# Patient Record
Sex: MALE | Race: WHITE | NOT HISPANIC OR LATINO | URBAN - NONMETROPOLITAN AREA
[De-identification: names, ages, dates, MRNs, and addresses within clinical notes are randomized per-mention and may not be internally consistent; named-entity substitution may affect disease eponyms.]

---

## 2019-01-08 ENCOUNTER — IMPORTED ENCOUNTER (OUTPATIENT)
Dept: URBAN - NONMETROPOLITAN AREA CLINIC 1 | Facility: CLINIC | Age: 64
End: 2019-01-08

## 2019-01-08 PROBLEM — H40.1131: Noted: 2019-01-08

## 2019-01-08 PROBLEM — Z96.1: Noted: 2019-01-08

## 2019-01-08 PROCEDURE — 99204 OFFICE O/P NEW MOD 45 MIN: CPT

## 2019-01-08 NOTE — PATIENT DISCUSSION
s/p PCIOL-Stable PCIOL OSCOAG-Appears stable. -Continue Combigan OU BID  as directed.  Stressed importance of compliance.-Order HVF 24-2-RTC 3mos Ta check and HVF 24-2; '[facility]

## 2019-07-02 ENCOUNTER — IMPORTED ENCOUNTER (OUTPATIENT)
Dept: URBAN - METROPOLITAN AREA CLINIC 1 | Facility: CLINIC | Age: 64
End: 2019-07-02

## 2019-07-02 PROBLEM — H40.52X1: Noted: 2019-07-02

## 2019-07-02 PROBLEM — H25.811: Noted: 2019-07-02

## 2019-07-02 PROBLEM — Z96.1: Noted: 2019-07-02

## 2019-07-02 PROCEDURE — 92133 CPTRZD OPH DX IMG PST SGM ON: CPT

## 2019-07-02 PROCEDURE — 99203 OFFICE O/P NEW LOW 30 MIN: CPT

## 2019-07-02 PROCEDURE — 92015 DETERMINE REFRACTIVE STATE: CPT

## 2019-07-02 NOTE — PATIENT DISCUSSION
1.  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 H/o Bleb OS. IOP in good control on Combigan BID OS. OCT shows moderate thinning OS. Cont Combigan BID OS. 2.  Cataract OD: Observe for now without intervention. The patient was advised to contact us if any change or worsening of vision3. Pseudophakia OS- [facility]) Phaco secondary to previous trauma OS. .  MRx for glasses given Return for an appointment in 6 mo 10 VF 24-2 OU with Dr. Guillermina Angel.

## 2020-10-09 ENCOUNTER — IMPORTED ENCOUNTER (OUTPATIENT)
Dept: URBAN - NONMETROPOLITAN AREA CLINIC 1 | Facility: CLINIC | Age: 65
End: 2020-10-09

## 2020-10-09 PROCEDURE — 92015 DETERMINE REFRACTIVE STATE: CPT

## 2020-10-09 PROCEDURE — 92133 CPTRZD OPH DX IMG PST SGM ON: CPT

## 2020-10-09 PROCEDURE — 92014 COMPRE OPH EXAM EST PT 1/>: CPT

## 2020-10-09 NOTE — PATIENT DISCUSSION
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

## 2020-10-10 PROBLEM — H52.03: Noted: 2020-10-10

## 2020-10-10 PROBLEM — H40.32X1: Noted: 2020-10-10

## 2020-10-10 PROBLEM — Z96.1: Noted: 2020-10-10

## 2020-10-10 PROBLEM — H52.4: Noted: 2020-10-10

## 2020-11-03 ENCOUNTER — IMPORTED ENCOUNTER (OUTPATIENT)
Dept: URBAN - NONMETROPOLITAN AREA CLINIC 1 | Facility: CLINIC | Age: 65
End: 2020-11-03

## 2020-11-03 PROCEDURE — 99213 OFFICE O/P EST LOW 20 MIN: CPT

## 2020-11-03 NOTE — PATIENT DISCUSSION
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

## 2021-02-18 ENCOUNTER — IMPORTED ENCOUNTER (OUTPATIENT)
Dept: URBAN - NONMETROPOLITAN AREA CLINIC 1 | Facility: CLINIC | Age: 66
End: 2021-02-18

## 2021-02-18 PROCEDURE — 92083 EXTENDED VISUAL FIELD XM: CPT

## 2021-02-18 NOTE — PATIENT DISCUSSION
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

## 2021-05-17 ENCOUNTER — IMPORTED ENCOUNTER (OUTPATIENT)
Dept: URBAN - NONMETROPOLITAN AREA CLINIC 1 | Facility: CLINIC | Age: 66
End: 2021-05-17

## 2021-05-17 ENCOUNTER — PREPPED CHART (OUTPATIENT)
Dept: URBAN - NONMETROPOLITAN AREA CLINIC 4 | Facility: CLINIC | Age: 66
End: 2021-05-17

## 2021-05-17 PROCEDURE — 92133 CPTRZD OPH DX IMG PST SGM ON: CPT

## 2021-05-17 PROCEDURE — 92020 GONIOSCOPY: CPT

## 2021-05-17 PROCEDURE — 99213 OFFICE O/P EST LOW 20 MIN: CPT

## 2021-05-17 NOTE — PATIENT DISCUSSION
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-  discussed findings w/patient-  IOPs 14 OU-  24-2 VF done 2/18/2021    OD: UR central scotoma baseline    OS: UR central scotoma inferior arcuate scotoma baseline-  OCT ONH 5/17/2021   OD: 8/10 SS 93 um Normal RFNL all quadrants   OS: 6/10 SS  76 um severe temporal thinning normal RFNL all other quadrants- Gonio done 5/17/2021   Grade III moderate-  continue Latanoprost QHS OS -  start Lumify PRN-  RTC 3 mo f/u w/OCT ON and GonioPseudophakia OS-  discussed findings w/patient-  no changes noted at this time-  continue to monitor as scheduled or prn; 's Notes: Patient is [address] 10/9/2020DFE 10/9/111475-0 VF 2/18/2021OCT Duarte Zamora 74 5/17/2021Gonio 5/17/2021

## 2021-05-17 NOTE — PATIENT DISCUSSION
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-  discussed findings w/patient-  IOPs 14 OU-  24-2 VF done 2/18/2021    OD: UR central scotoma baseline    OS: UR central scotoma inferior arcuate scotoma baseline-  OCT ONH 5/17/2021   OD: 8/10 SS 93 um Normal RFNL all quadrants   OS: 6/10 SS  76 um severe temporal thinning normal RFNL all other quadrants- Gonio done 5/17/2021   Grade III moderate-  continue Latanoprost QHS OS -  start Lumify PRN-  RTC 3 mo f/u w/OCT ON and GonioPseudophakia OS-  discussed findings w/patient-  no changes noted at this time-  continue to monitor as scheduled or prn; 's Notes: Patient is [address] 10/9/2020DFE 10/9/883081-7 VF 2/18/2021OCT [address] 74 5/17/2021Gonio 5/17/2021

## 2022-01-17 NOTE — PATIENT DISCUSSION
Discussed excision on follow-up if no improvement/resolution. Will biopsy lid margin at same time if no improvement/resolution.

## 2022-01-31 ENCOUNTER — COMPREHENSIVE EXAM (OUTPATIENT)
Dept: URBAN - NONMETROPOLITAN AREA CLINIC 4 | Facility: CLINIC | Age: 67
End: 2022-01-31

## 2022-01-31 DIAGNOSIS — H40.32X1: ICD-10-CM

## 2022-01-31 PROCEDURE — 99213 OFFICE O/P EST LOW 20 MIN: CPT

## 2022-01-31 ASSESSMENT — VISUAL ACUITY
OS_SC: 20/25
OU_SC: 20/20
OU_SC: 20/20
OD_SC: 20/20

## 2022-01-31 ASSESSMENT — TONOMETRY
OS_IOP_MMHG: 14
OD_IOP_MMHG: 15

## 2022-04-08 ASSESSMENT — TONOMETRY
OS_IOP_MMHG: 8
OD_IOP_MMHG: 16

## 2022-04-08 ASSESSMENT — VISUAL ACUITY
OD_CC: 20/30-2
OS_CC: 20/30

## 2022-04-10 ASSESSMENT — TONOMETRY
OS_IOP_MMHG: 14
OS_IOP_MMHG: 14
OD_IOP_MMHG: 13
OD_IOP_MMHG: 14
OD_IOP_MMHG: 13
OD_IOP_MMHG: 11
OS_IOP_MMHG: 13
OS_IOP_MMHG: 13
OS_IOP_MMHG: 11
OD_IOP_MMHG: 14

## 2022-04-10 ASSESSMENT — VISUAL ACUITY
OU_SC: 20/30
OS_CC: 20/20-1
OU_CC: 20/20
OD_CC: 20/30+
OU_CC: 20/20
OS_CC: 20/25
OD_CC: 20/25+2
OD_CC: 20/20
OD_CC: 20/20
OS_CC: 20/25
OD_CC: 20/20
OS_CC: 20/50-2
OS_CC: 20/25+

## 2022-05-04 ENCOUNTER — FOLLOW UP (OUTPATIENT)
Dept: URBAN - NONMETROPOLITAN AREA CLINIC 4 | Facility: CLINIC | Age: 67
End: 2022-05-04

## 2022-05-04 DIAGNOSIS — H40.32X1: ICD-10-CM

## 2022-05-04 PROCEDURE — 99213 OFFICE O/P EST LOW 20 MIN: CPT

## 2022-05-04 PROCEDURE — 92083 EXTENDED VISUAL FIELD XM: CPT

## 2022-05-04 ASSESSMENT — TONOMETRY
OD_IOP_MMHG: 15
OS_IOP_MMHG: 16

## 2022-05-04 ASSESSMENT — VISUAL ACUITY
OS_SC: 20/25+2
OD_SC: 20/25

## 2022-09-14 ENCOUNTER — FOLLOW UP (OUTPATIENT)
Dept: URBAN - NONMETROPOLITAN AREA CLINIC 4 | Facility: CLINIC | Age: 67
End: 2022-09-14

## 2022-09-14 DIAGNOSIS — H26.492: ICD-10-CM

## 2022-09-14 DIAGNOSIS — H25.811: ICD-10-CM

## 2022-09-14 DIAGNOSIS — H40.32X1: ICD-10-CM

## 2022-09-14 PROCEDURE — 92014 COMPRE OPH EXAM EST PT 1/>: CPT

## 2022-09-14 ASSESSMENT — VISUAL ACUITY
OD_SC: 20/20-1
OU_SC: 20/30
OU_SC: 20/20-1
OS_SC: 20/20

## 2022-09-14 ASSESSMENT — TONOMETRY
OS_IOP_MMHG: 14
OD_IOP_MMHG: 14

## 2022-09-14 NOTE — PATIENT DISCUSSION
(Observe) Observe and consider YAG cap when pt feels pco visually significant and visual acuity decreases to appropriate level. Number Of Curettages: 3 Additional Information: (Optional): The wound was cleaned, and a pressure dressing was applied.  The patient received detailed post-op instructions. Concentration (Mg/Ml Or Millions Of Plaque Forming Units/Cc): 0.01 Post-Care Instructions: I reviewed with the patient in detail post-care instructions. Patient is to keep the area dry for 48 hours, and not to engage in any swimming until the area is healed. Should the patient develop any fevers, chills, bleeding, severe pain patient will contact the office immediately. Detail Level: Detailed Add Intralesional Injection: No Consent was obtained from the patient. The risks, benefits and alternatives to therapy were discussed in detail. Specifically, the risks of infection, scarring, bleeding, prolonged wound healing, nerve injury, incomplete removal, allergy to anesthesia and recurrence were addressed. Alternatives to ED&C, such as: surgical removal and XRT were also discussed.  Prior to the procedure, the treatment site was clearly identified and confirmed by the patient. All components of Universal Protocol/PAUSE Rule completed. Cautery Type: electrodesiccation Total Volume (Ccs): 1 Body Location Override (Optional - Billing Will Still Be Based On Selected Body Map Location If Applicable): right upper back Anesthesia Type: 1% lidocaine with epinephrine What Was Performed First?: Curettage Size Of Lesion In Cm: 1.2 Size Of Lesion After Curettage: 1.4 Bill As A Line Item Or As Units: Line Item

## 2022-10-26 NOTE — PATIENT DISCUSSION
RD/tear warning symptoms reviewed. F&F brochure given. Call immediately if increase in floaters, flashes, veil, blur. Breath sounds clear and equal bilaterally.

## 2022-10-26 NOTE — PATIENT DISCUSSION
This visual field clearly demonstrated a minimum of 43% loss of upper field of vision OU, with upper lid skin in repose and elevated by taping of the lid to demonstrate potential correction. This field shows that taping the lids significantly improved this patient's superior field of vision by approximately 34%, OU.

## 2023-01-04 NOTE — PROCEDURE NOTE: SURGICAL
"<div><p><strong>MR #: [id number]</strong></p><p><strong>&nbsp;</strong></p><p><strong>PREOPERATIVE DIAGNOSIS</strong>: <span>&nbsp;&nbsp; </span><span>&nbsp;&nbsp;&nbsp;&nbsp;&nbsp;&nbsp;&nbsp;&nbsp;&nbsp;&nbsp;&nbsp;</span>Dermatochalasis upper lids</p><p><span>&nbsp;</span></p><p><strong>POSTOPERATIVE DIAGNOSIS</strong>: <span>&nbsp;&nbsp;&nbsp;&nbsp;&nbsp;&nbsp;&nbsp;&nbsp;&nbsp;&nbsp;&nbsp; </span>Same </p><p>&nbsp;</p><p style=""margin-right:-13.5pt;tab-stops:-1.0in;""><strong>PROCEDURE:</strong> <span>&nbsp; &nbsp; &nbsp; &nbsp; &nbsp; &nbsp;</span>Upper Lid Blepharoplasty with Fat Removal OU<strong><span>&nbsp;&nbsp;&nbsp;&nbsp;&nbsp;&nbsp;&nbsp;&nbsp;&nbsp;&nbsp;&nbsp;&nbsp;&nbsp;&nbsp;&nbsp;&nbsp;&nbsp;&nbsp;&nbsp;&nbsp;&nbsp;&nbsp;&nbsp;&nbsp;&nbsp;&nbsp;&nbsp;&nbsp;&nbsp; </span><span>&nbsp;&nbsp;&nbsp;&nbsp;&nbsp;&nbsp;&nbsp;&nbsp;&nbsp;&nbsp;&nbsp;&nbsp;&nbsp;&nbsp;&nbsp;&nbsp;&nbsp;&nbsp;&nbsp;&nbsp;&nbsp;&nbsp;&nbsp; </span></strong></p><p><strong>SURGEON:</strong> <span>&nbsp; &nbsp; &nbsp; &nbsp; &nbsp; &nbsp; &nbsp; &nbsp;</span>Jong Almazan

## 2023-01-11 NOTE — PATIENT DISCUSSION
One week post op: great curve and shape, no infection, healing well, sutures intact and removed 1/11/23, use erythromycin BID to upper eyelids x 10 days. Use Skinuva BID x 1 month to incisions after 10 days. Wear sunglasses and hat while outdoors. Avoid sun exposure. No restrictions in 5 days.

## 2023-01-11 NOTE — PATIENT DISCUSSION
Recommend Bilateral lower lid blepharoplasty trans conj laser (discussed risks and benefits of sx. .. ). negative detailed exam

## 2023-01-12 ENCOUNTER — CONSULTATION/EVALUATION (OUTPATIENT)
Dept: URBAN - NONMETROPOLITAN AREA CLINIC 4 | Facility: CLINIC | Age: 68
End: 2023-01-12

## 2023-01-12 DIAGNOSIS — H25.811: ICD-10-CM

## 2023-01-12 PROCEDURE — 99214 OFFICE O/P EST MOD 30 MIN: CPT

## 2023-01-12 ASSESSMENT — VISUAL ACUITY
OD_BAT: 20/40
OS_AM: 20/30
OU_SC: 20/30
OS_SC: 20/100
OU_SC: 20/20
OS_BAT: 20/40
OD_SC: 20/25
OD_SC: 20/30
OS_SC: 20/25-2
OD_PAM: 20/25

## 2023-01-12 ASSESSMENT — TONOMETRY
OD_IOP_MMHG: 15
OS_IOP_MMHG: 16

## 2023-01-12 NOTE — PATIENT DISCUSSION
Visually significant cataract. Cataract(s) causing symptomatic impairment of visual function not correctable with a tolerable change in glasses or contact lenses, lighting, or non-operative means resulting in specific activity limitations and/or participation restrictions including, but not limited to reading, viewing television, driving, or meeting vocational or recreational needs. Expectation is clearer vision and functional improvement in symptoms as well as reduced glare disability after cataract removal. Order IOL Master and OPD today. Recommend Toric IOL / Limbal Relaxing Incisions / Multifocal, based on today's OPD testing and lifestyle questionnaire. All questions were answered regarding surgery, including pre- and post-op medications, appointments, activity restrictions and anesthetic usage. The risks, benefits and alternatives, and special risk factors for the patient, were discussed in detail, including but not limited to bleeding, infection, retinal detachment, vitreous loss, problems with the implant, and possible need for additional surgery. Although rare, the possibility of complete vision loss was discussed. The possible need for glasses post-operatively was discussed. Order medical clearance exam based on history of acid reflux . Patient elects to proceed with cataract surgery OD. Pt elects Vivity LRI and lenSx surgery.

## 2023-01-12 NOTE — PATIENT DISCUSSION
IOP is stable today OU. Reviewed and discussed all diagnostic testing with pt. Advised patient to continue current management.

## 2025-01-09 ENCOUNTER — COMPREHENSIVE EXAM (OUTPATIENT)
Age: 70
End: 2025-01-09

## 2025-01-09 DIAGNOSIS — H25.811: ICD-10-CM

## 2025-01-09 DIAGNOSIS — H26.492: ICD-10-CM

## 2025-01-09 DIAGNOSIS — H40.32X1: ICD-10-CM

## 2025-01-09 DIAGNOSIS — Z96.1: ICD-10-CM

## 2025-01-09 PROCEDURE — 99214 OFFICE O/P EST MOD 30 MIN: CPT

## 2025-06-30 ENCOUNTER — FOLLOW UP (OUTPATIENT)
Age: 70
End: 2025-06-30

## 2025-06-30 DIAGNOSIS — H25.811: ICD-10-CM

## 2025-06-30 DIAGNOSIS — H26.492: ICD-10-CM

## 2025-06-30 DIAGNOSIS — D48.1: ICD-10-CM

## 2025-06-30 DIAGNOSIS — H40.32X1: ICD-10-CM

## 2025-06-30 DIAGNOSIS — Z96.1: ICD-10-CM

## 2025-06-30 PROCEDURE — 92133 CPTRZD OPH DX IMG PST SGM ON: CPT

## 2025-06-30 PROCEDURE — 99214 OFFICE O/P EST MOD 30 MIN: CPT
